# Patient Record
Sex: MALE | Race: WHITE | URBAN - METROPOLITAN AREA
[De-identification: names, ages, dates, MRNs, and addresses within clinical notes are randomized per-mention and may not be internally consistent; named-entity substitution may affect disease eponyms.]

---

## 2018-02-21 ENCOUNTER — THERAPY VISIT (OUTPATIENT)
Dept: PHYSICAL THERAPY | Facility: CLINIC | Age: 53
End: 2018-02-21
Payer: COMMERCIAL

## 2018-02-21 DIAGNOSIS — M25.511 SHOULDER PAIN, RIGHT: Primary | ICD-10-CM

## 2018-02-21 PROCEDURE — 97161 PT EVAL LOW COMPLEX 20 MIN: CPT | Mod: GP | Performed by: PHYSICAL THERAPIST

## 2018-02-21 PROCEDURE — 97110 THERAPEUTIC EXERCISES: CPT | Mod: GP | Performed by: PHYSICAL THERAPIST
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
## 2018-02-21 NOTE — MR AVS SNAPSHOT
"              After Visit Summary   2/21/2018    Emmanuel Drew    MRN: [id number]           Patient Information     Date Of Birth          1965        Visit Information        Provider Department      2/21/2018 8:20 AM Aydee Montanez, PT [facility]        Today's Diagnoses     Shoulder pain, right    -  1       Follow-ups after your visit        Your next 10 appointments already scheduled     Mar 01, 2018  4:10 PM CST   RUSSELL Extremity with Aydee Montanez, PT   [facility] ([facility]  )    [address]  [facility] [id number] [phone]              Who to contact     If you have questions or need follow up information about today's clinic visit or your schedule please contact [facility]AP[facility] directly at [phone].  Normal or non-critical lab and imaging results will be communicated to you by WiMi5hart, letter or phone within 4 business days after the clinic has received the results. If you do not hear from us within 7 days, please contact the clinic through WiMi5hart or phone. If you have a critical or abnormal lab result, we will notify you by phone as soon as possible.  Submit refill requests through PerBlue or call your pharmacy and they will forward the refill request to us. Please allow 3 business days for your refill to be completed.          Additional Information About Your Visit        MyChart Information     PerBlue lets you send messages to your doctor, view your test results, renew your prescriptions, schedule appointments and more. To sign up, go to [email or web address] . Click on \"Log in\" on the left side of the screen, which will take you to the Welcome page. Then click on \"Sign up Now\" on the right side of the page.     You will be asked to enter the access code listed below, as well as some personal information. Please follow the directions to create your username and " password.     Your access code is: [email or web address]  Expires: 2018 11:54 AM     Your access code will  in 90 days. If you need help or a new code, please call your [facility] or [phone].        Care EveryWhere ID     This is your Care EveryWhere ID. This could be used by other organizations to access your [facility] medical records  [id number]         Blood Pressure from Last 3 Encounters:   No data found for BP    Weight from Last 3 Encounters:   No data found for Wt              We Performed the Following     HC PT EVAL, LOW COMPLEXITY     RUSSELL INITIAL EVAL REPORT     THERAPEUTIC EXERCISES        Primary Care Provider Fax #    Physician No Ref-Primary [phone]       No address on file        Equal Access to Services     MAC GIBSON : Karel Partida, wasofi kauradaha, qaybta kaalmada naelyageorge, grant burrell . So [facility] [phone].    ATENCIÓN: Si habla español, tiene a taylor disposición servicios gratuitos de asistencia lingüística. Llame al [phone].    We comply with applicable federal civil rights laws and [place] laws. We do not discriminate on the basis of race, color, national origin, age, disability, sex, sexual orientation, or gender identity.            Thank you!     Thank you for choosing [facility] PHYSICAL THERAPY  for your care. Our goal is always to provide you with excellent care. Hearing back from our patients is one way we can continue to improve our services. Please take a few minutes to complete the written survey that you may receive in the mail after your visit with us. Thank you!             Your Updated Medication List - Protect others around you: Learn how to safely use, store and throw away your medicines at www.disposemymeds.org.      Notice  As of 2018 11:54 AM    You have not been prescribed any medications.
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
## 2018-03-01 ENCOUNTER — THERAPY VISIT (OUTPATIENT)
Dept: PHYSICAL THERAPY | Facility: CLINIC | Age: 53
End: 2018-03-01
Payer: COMMERCIAL

## 2018-03-01 DIAGNOSIS — M25.511 RIGHT SHOULDER PAIN, UNSPECIFIED CHRONICITY: Primary | ICD-10-CM

## 2018-03-01 PROCEDURE — 97110 THERAPEUTIC EXERCISES: CPT | Mod: GP | Performed by: PHYSICAL THERAPIST

## 2018-03-01 PROCEDURE — 97112 NEUROMUSCULAR REEDUCATION: CPT | Mod: GP | Performed by: PHYSICAL THERAPIST

## 2018-03-06 ENCOUNTER — THERAPY VISIT (OUTPATIENT)
Dept: PHYSICAL THERAPY | Facility: CLINIC | Age: 53
End: 2018-03-06
Payer: COMMERCIAL

## 2018-03-06 DIAGNOSIS — M25.511 RIGHT SHOULDER PAIN, UNSPECIFIED CHRONICITY: ICD-10-CM

## 2018-03-06 PROCEDURE — 97112 NEUROMUSCULAR REEDUCATION: CPT | Mod: GP | Performed by: PHYSICAL THERAPIST

## 2018-03-06 PROCEDURE — 97110 THERAPEUTIC EXERCISES: CPT | Mod: GP | Performed by: PHYSICAL THERAPIST

## 2018-03-12 ENCOUNTER — THERAPY VISIT (OUTPATIENT)
Dept: PHYSICAL THERAPY | Facility: CLINIC | Age: 53
End: 2018-03-12
Payer: COMMERCIAL

## 2018-03-12 DIAGNOSIS — M25.511 RIGHT SHOULDER PAIN, UNSPECIFIED CHRONICITY: ICD-10-CM

## 2018-03-12 PROCEDURE — 97112 NEUROMUSCULAR REEDUCATION: CPT | Mod: GP | Performed by: PHYSICAL THERAPIST

## 2018-03-12 PROCEDURE — 97110 THERAPEUTIC EXERCISES: CPT | Mod: GP | Performed by: PHYSICAL THERAPIST

## 2018-03-19 ENCOUNTER — THERAPY VISIT (OUTPATIENT)
Dept: PHYSICAL THERAPY | Facility: CLINIC | Age: 53
End: 2018-03-19
Payer: COMMERCIAL

## 2018-03-19 DIAGNOSIS — M25.511 RIGHT SHOULDER PAIN, UNSPECIFIED CHRONICITY: ICD-10-CM

## 2018-03-19 PROCEDURE — 97110 THERAPEUTIC EXERCISES: CPT | Mod: GP | Performed by: PHYSICAL THERAPIST

## 2018-03-19 NOTE — PROGRESS NOTES
Subjective:  HPI                    Objective:  System    Physical Exam    General     ROS    Assessment/Plan:    PROGRESS  REPORT    Progress reporting period is from 2/21/18 to 3/19/18.       SUBJECTIVE  Subjective changes noted by patient: Patient reports with the addition of strength his pain increased to a constant ache this week.  Pain is present in the right neck and upper trap as well as along the shoulder and into the upper arm.  Prior to adding shoulder strength in last week pain was under control with neck exercises and pain was only present at end-range overhead motions.  We discussed to hold shoulder strength and to continue with neck exercises.  Will follow back with MD to discuss current symptoms.    Current pain level is 4/10.     Previous pain level was 10/10.   Changes in function:  Yes (See Goal flowsheet attached for changes in current functional level)  Adverse reaction to treatment or activity: None    OBJECTIVE  Changes noted in objective findings:  Yes,     Shoulder AROM   Flexion 168+   Abduction 166+   Extension 50   IR T12+   ER 57     CROM   Flexion nil loss   Extension min loss   Retraction min loss   Rotation (L)  min loss with pain in the right neck   Rotation (R) min loss with pain in the right neck   LF (L) nil loss with pain in the right neck   LF (R) min loss with pain in the right neck     ASSESSMENT/PLAN  Updated problem list and treatment plan: Diagnosis 1:  Right shoulder  Pain -  manual therapy, self management, education and directional preference exercise  Decreased ROM/flexibility - manual therapy and therapeutic exercise  Decreased strength - therapeutic exercise and therapeutic activities  Impaired muscle performance - neuro re-education  Decreased function - therapeutic activities  STG/LTGs have been met or progress has been made towards goals:  Yes (See Goal flow sheet completed today.)  Assessment of Progress: The patient's progress has plateaued.  The patient's  progress has slowed.  Self Management Plans:  Patient has been instructed in a home treatment program.  Patient  has been instructed in self management of symptoms.  I have re-evaluated this patient and find that the nature, scope, duration and intensity of the therapy is appropriate for the medical condition of the patient.  Emmanuel continues to require the following intervention to meet STG and LTG's:  PT    Recommendations:  This patient would benefit from further evaluation.Follow up with MD to discuss work up of the cervical spine including imaging.    Please refer to the daily flowsheet for treatment today, total treatment time and time spent performing 1:1 timed codes.

## 2018-03-19 NOTE — LETTER
[facility] PHYSICAL THERAPY  [address]  [facility] [place] [facility] [address]  [phone]    2018    Re: Emmanuel Drew   :   1965  MRN:  [id number]   REFERRING PHYSICIAN:   Duane King    [facility]YSICAL [facility]ER[facility]  Date of Initial Evaluation:  18  Visits:  Rxs Used: 5  Reason for Referral:  Right shoulder pain, unspecified chronicity    PROGRESS  REPORT  Progress reporting period is from 18 to 3/19/18.       SUBJECTIVE  Subjective changes noted by patient: Patient reports with the addition of strength his pain increased to a constant ache this week.  Pain is present in the right neck and upper trap as well as along the shoulder and into the upper arm.  Prior to adding shoulder strength in last week pain was under control with neck exercises and pain was only present at end-range overhead motions.  We discussed to hold shoulder strength and to continue with neck exercises.  Will follow back with MD to discuss current symptoms.      Current pain level is 4/10.     Previous pain level was 10/10.   Changes in function:  Yes (See Goal flowsheet attached for changes in current functional level)  Adverse reaction to treatment or activity: None    OBJECTIVE  Changes noted in objective findings:  Yes,     Shoulder AROM   Flexion 168+   Abduction 166+   Extension 50   IR T12+   ER 57     CROM   Flexion nil loss   Extension min loss   Retraction min loss   Rotation (L)  min loss with pain in the right neck   Rotation (R) min loss with pain in the right neck   LF (L) nil loss with pain in the right neck   LF (R) min loss with pain in the right neck       ASSESSMENT/PLAN  Updated problem list and treatment plan:     Diagnosis 1:  Right shoulder  Pain -  manual therapy, self management, education and directional preference exercise  Decreased ROM/flexibility - manual therapy and therapeutic exercise  Decreased strength - therapeutic exercise and therapeutic  activities  Impaired muscle performance - neuro re-education  Decreased function - therapeutic activities    STG/LTGs have been met or progress has been made towards goals:  Yes (See Goal flow sheet completed today.)    Assessment of Progress: The patient's progress has plateaued.  The patient's progress has slowed.    Self Management Plans:  Patient has been instructed in a home treatment program.  Patient  has been instructed in self management of symptoms.    I have re-evaluated this patient and find that the nature, scope, duration and intensity of the therapy is appropriate for the medical condition of the patient.    Emmanuel continues to require the following intervention to meet STG and LTG's:  PT    Recommendations:  This patient would benefit from further evaluation.Follow up with MD to discuss work up of the cervical spine including imaging.    Thank you for your referral.    INQUIRIES  Therapist: Aydee Montanez DPT, Cert. MDT  [facility] PHYSICAL THERAPY  [address]  [facility] [place] [facility] [address]  Phone: [phone]  Fax: [phone]

## 2018-03-19 NOTE — MR AVS SNAPSHOT
"              After Visit Summary   3/19/2018    Emmanuel Drew    MRN: [id number]           Patient Information     Date Of Birth          1965        Visit Information        Provider Department      3/19/2018 10:20 AM Aydee Montanez, PT [facility]        Today's Diagnoses     Right shoulder pain, unspecified chronicity           Follow-ups after your visit        Who to contact     If you have questions or need follow up information about today's clinic visit or your schedule please contact [facility] PHYSICAL THERAP[facility] directly at [phone].  Normal or non-critical lab and imaging results will be communicated to you by [email or web address], letter or phone within 4 business days after the clinic has received the results. If you do not hear from us within 7 days, please contact the clinic through [email or web address] or phone. If you have a critical or abnormal lab result, we will notify you by phone as soon as possible.  Submit refill requests through [employer] or call your pharmacy and they will forward the refill request to us. Please allow 3 business days for your refill to be completed.          Additional Information About Your Visit        MyChart Information     [employer] lets you send messages to your doctor, view your test results, renew your prescriptions, schedule appointments and more. To sign up, go to [email or web address] . Click on \"Log in\" on the left side of the screen, which will take you to the Welcome page. Then click on \"Sign up Now\" on the right side of the page.     You will be asked to enter the access code listed below, as well as some personal information. Please follow the directions to create your username and password.     Your access code is: [email or web address]  Expires: 2018 12:54 PM     Your access code will  in 90 days. If you need help or a new code, please call your [place] clinic or [phone].        Care EveryWhere ID     This is " your Care EveryWhere ID. This could be used by other organizations to access your [facility] medical records  [id number]         Blood Pressure from Last 3 Encounters:   No data found for BP    Weight from Last 3 Encounters:   No data found for Wt              We Performed the Following     [facility] PROGRESS NOTES REPORT     THERAPEUTIC EXERCISES        Primary Care Provider Fax #    Physician No Ref-Primary [phone]       No address on file        Equal Access to Services     [facility]: Hadii aad ku hadasho Soomaali, waaxda luqadaha, qaybta kaalmada adeegyada, waxay idiin hayaan adeeg khcobysh lasam . So [facility] [phone].    ATENCIÓN: Si habla español, tiene a taylor disposición servicios gratuitos de asistencia lingüística. Llame al [phone].    We comply with applicable federal civil rights laws and [place] laws. We do not discriminate on the basis of race, color, national origin, age, disability, sex, sexual orientation, or gender identity.            Thank you!     Thank you for choosing [facility] PHYSICAL THERAPY  for your care. Our goal is always to provide you with excellent care. Hearing back from our patients is one way we can continue to improve our services. Please take a few minutes to complete the written survey that you may receive in the mail after your visit with us. Thank you!             Your Updated Medication List - Protect others around you: Learn how to safely use, store and throw away your medicines at www.disposemymeds.org.      Notice  As of 3/19/2018 11:14 AM    You have not been prescribed any medications.